# Patient Record
(demographics unavailable — no encounter records)

---

## 2025-03-04 NOTE — CONSULT LETTER
[Today's Date] : [unfilled] [Name] : Name: [unfilled] [] : : ~~ [Today's Date:] : [unfilled] [Dear  ___:] : Dear Dr. [unfilled]: [Consult] : I had the pleasure of evaluating your patient, [unfilled]. My full evaluation follows. [Consult - Single Provider] : Thank you very much for allowing me to participate in the care of this patient. If you have any questions, please do not hesitate to contact me. [Sincerely,] : Sincerely, [FreeTextEntry4] : STEPHAN WARD MD [de-identified] : Rashid Germain MD, FAAP, FACC  Pediatric Cardiologist  of Pediatrics [facility]

## 2025-03-04 NOTE — CONSULT LETTER
[Today's Date] : [unfilled] [Name] : Name: [unfilled] [] : : ~~ [Today's Date:] : [unfilled] [Dear  ___:] : Dear Dr. [unfilled]: [Consult] : I had the pleasure of evaluating your patient, [unfilled]. My full evaluation follows. [Consult - Single Provider] : Thank you very much for allowing me to participate in the care of this patient. If you have any questions, please do not hesitate to contact me. [Sincerely,] : Sincerely, [FreeTextEntry4] : STEPHAN WARD MD [de-identified] : Rashid Germian MD, FAAP, FACC  Pediatric Cardiologist  of Pediatrics [facility]

## 2025-03-04 NOTE — CARDIOLOGY SUMMARY
[Today's Date] : [unfilled] [FreeTextEntry1] : Normal sinus rhythm without preexcitation or ectopy. Heart rate (bpm): 96 [FreeTextEntry2] : 1. Normal left ventricular size, morphology and systolic function. 2. Trivial pulmonary valve regurgitation. 3. Normal right ventricular morphology with qualitatively normal size and systolic function. 4. No pericardial effusion.

## 2025-03-04 NOTE — HISTORY OF PRESENT ILLNESS
[FreeTextEntry1] : FABIO is a 7 year female who presents for evaluation of a systolic heart murmur that was heard on a physical examination a few weeks ago. FABIO  was not ill at the time of the visit. FABIO is asymptomatic from a cardiac standpoint and she denies chest pain, palpitations, presyncope, syncope or exercise intolerance. There is no family history of congenital heart disease, sudden cardiac death or arrhythmia.

## 2025-03-04 NOTE — DISCUSSION/SUMMARY
[FreeTextEntry1] : FABIO has an innocent murmur and a normal cardiac exam, electrocardiogram and echocardiogram. She has trivial PI which estimates normal PA pressures and is within normal limits.  I reassured the patient and her  family that FABIO's heart is structurally and functionally normal and that the murmur heard does not represent a cardiac abnormality.  All physical activities may be performed without restriction and there is no need for routine follow-up unless future concerns arise.  [Needs SBE Prophylaxis] : [unfilled] does not need bacterial endocarditis prophylaxis [PE + No Restrictions] : [unfilled] may participate in the entire physical education program without restriction, including all varsity competitive sports.